# Patient Record
Sex: MALE | Race: WHITE | URBAN - METROPOLITAN AREA
[De-identification: names, ages, dates, MRNs, and addresses within clinical notes are randomized per-mention and may not be internally consistent; named-entity substitution may affect disease eponyms.]

---

## 2017-06-07 ENCOUNTER — HISTORICAL (OUTPATIENT)
Dept: ADMINISTRATIVE | Facility: HOSPITAL | Age: 82
End: 2017-06-07

## 2017-06-07 LAB
ERYTHROCYTE [DISTWIDTH] IN BLOOD BY AUTOMATED COUNT: 16.9 % (ref 11.5–17)
HCT VFR BLD AUTO: 39.5 % (ref 42–52)
HGB BLD-MCNC: 12.4 GM/DL (ref 14–18)
MCH RBC QN AUTO: 26.7 PG (ref 27–31)
MCHC RBC AUTO-ENTMCNC: 31.4 GM/DL (ref 33–36)
MCV RBC AUTO: 85.1 FL (ref 80–94)
PLATELET # BLD AUTO: 58 X10(3)/MCL (ref 130–400)
PMV BLD AUTO: ABNORMAL FL (ref 9.4–12.4)
RBC # BLD AUTO: 4.64 X10(6)/MCL (ref 4.7–6.1)
WBC # SPEC AUTO: 3.5 X10(3)/MCL (ref 4.5–11.5)

## 2017-08-31 ENCOUNTER — HISTORICAL (OUTPATIENT)
Dept: ADMINISTRATIVE | Facility: HOSPITAL | Age: 82
End: 2017-08-31

## 2017-08-31 LAB
ABS NEUT (OLG): 1 X10(3)/MCL (ref 2.1–9.2)
ALBUMIN SERPL-MCNC: 4.2 GM/DL (ref 3.4–5)
ALBUMIN/GLOB SERPL: 0.9 RATIO (ref 1.1–2)
ALP SERPL-CCNC: 63 UNIT/L (ref 50–136)
ALT SERPL-CCNC: 14 UNIT/L (ref 12–78)
ANISOCYTOSIS BLD QL SMEAR: ABNORMAL
AST SERPL-CCNC: 16 UNIT/L (ref 15–37)
BASOPHILS # BLD AUTO: 0.1 X10(3)/MCL (ref 0–0.2)
BASOPHILS NFR BLD AUTO: 2.5 %
BASOPHILS NFR BLD MANUAL: 1 % (ref 0–2)
BILIRUB SERPL-MCNC: 1 MG/DL (ref 0.2–1)
BILIRUBIN DIRECT+TOT PNL SERPL-MCNC: 0.3 MG/DL (ref 0–0.5)
BILIRUBIN DIRECT+TOT PNL SERPL-MCNC: 0.7 MG/DL (ref 0–0.8)
BUN SERPL-MCNC: 27 MG/DL (ref 7–18)
CALCIUM SERPL-MCNC: 9.5 MG/DL (ref 8.5–10.1)
CHLORIDE SERPL-SCNC: 107 MMOL/L (ref 98–107)
CO2 SERPL-SCNC: 27 MMOL/L (ref 21–32)
CREAT SERPL-MCNC: 1.15 MG/DL (ref 0.7–1.3)
EOSINOPHIL # BLD AUTO: 0 X10(3)/MCL (ref 0–0.9)
EOSINOPHIL NFR BLD AUTO: 0.5 %
ERYTHROCYTE [DISTWIDTH] IN BLOOD BY AUTOMATED COUNT: 17.6 % (ref 11.5–17)
GLOBULIN SER-MCNC: 4.5 GM/DL (ref 2.4–3.5)
GLUCOSE SERPL-MCNC: 98 MG/DL (ref 74–106)
HCT VFR BLD AUTO: 41.6 % (ref 42–52)
HGB BLD-MCNC: 12.9 GM/DL (ref 14–18)
LYMPHOCYTES # BLD AUTO: 1.4 X10(3)/MCL (ref 0.6–4.6)
LYMPHOCYTES NFR BLD AUTO: 33.7 %
LYMPHOCYTES NFR BLD MANUAL: 1 %
LYMPHOCYTES NFR BLD MANUAL: 42 % (ref 13–40)
MCH RBC QN AUTO: 26.1 PG (ref 27–31)
MCHC RBC AUTO-ENTMCNC: 31 GM/DL (ref 33–36)
MCV RBC AUTO: 84.2 FL (ref 80–94)
MONOCYTES # BLD AUTO: 1.6 X10(3)/MCL (ref 0.1–1.3)
MONOCYTES NFR BLD AUTO: 38.7 %
MONOCYTES NFR BLD MANUAL: 28 % (ref 2–11)
NEUTROPHILS # BLD AUTO: 1 X10(3)/MCL (ref 2.1–9.2)
NEUTROPHILS NFR BLD AUTO: 24.6 %
NEUTROPHILS NFR BLD MANUAL: 29 % (ref 47–80)
PLATELET # BLD AUTO: 48 X10(3)/MCL (ref 130–400)
PLATELET # BLD EST: ABNORMAL 10*3/UL
PMV BLD AUTO: ABNORMAL FL (ref 9.4–12.4)
POTASSIUM SERPL-SCNC: 4.1 MMOL/L (ref 3.5–5.1)
PROT SERPL-MCNC: 8.7 GM/DL (ref 6.4–8.2)
RBC # BLD AUTO: 4.94 X10(6)/MCL (ref 4.7–6.1)
RBC MORPH BLD: ABNORMAL
SODIUM SERPL-SCNC: 143 MMOL/L (ref 136–145)
WBC # SPEC AUTO: 4.1 X10(3)/MCL (ref 4.5–11.5)

## 2017-12-22 LAB
INFLUENZA A ANTIGEN, POC: POSITIVE
INFLUENZA B ANTIGEN, POC: NEGATIVE

## 2018-02-28 ENCOUNTER — HISTORICAL (OUTPATIENT)
Dept: ADMINISTRATIVE | Facility: HOSPITAL | Age: 83
End: 2018-02-28

## 2018-02-28 LAB
ABS NEUT (OLG): 0.77 X10(3)/MCL (ref 2.1–9.2)
ANISOCYTOSIS BLD QL SMEAR: 1
BASOPHILS # BLD AUTO: 0 X10(3)/MCL (ref 0–0.2)
BASOPHILS NFR BLD AUTO: 0.2 %
DACRYOCYTES BLD QL SMEAR: 1 % (ref 0–0)
EOSINOPHIL # BLD AUTO: 0 X10(3)/MCL (ref 0–0.9)
EOSINOPHIL NFR BLD AUTO: 0.2 %
ERYTHROCYTE [DISTWIDTH] IN BLOOD BY AUTOMATED COUNT: 18.1 % (ref 11.5–17)
HCT VFR BLD AUTO: 38.1 % (ref 42–52)
HGB BLD-MCNC: 12.3 GM/DL (ref 14–18)
HYPOCHROMIA BLD QL SMEAR: 0
LYMPHOCYTES # BLD AUTO: 1.4 X10(3)/MCL (ref 0.6–4.6)
LYMPHOCYTES NFR BLD AUTO: 35.1 %
LYMPHOCYTES NFR BLD MANUAL: 39 % (ref 13–40)
MACROCYTES BLD QL SMEAR: 0
MCH RBC QN AUTO: 27.6 PG (ref 27–31)
MCHC RBC AUTO-ENTMCNC: 32.3 GM/DL (ref 33–36)
MCV RBC AUTO: 85.4 FL (ref 80–94)
MICROCYTES BLD QL SMEAR: 1
MONOCYTES # BLD AUTO: 1.8 X10(3)/MCL (ref 0.1–1.3)
MONOCYTES NFR BLD AUTO: 45.4 %
MONOCYTES NFR BLD MANUAL: 33 % (ref 2–11)
NEUTROPHILS # BLD AUTO: 0.8 X10(3)/MCL (ref 2.1–9.2)
NEUTROPHILS NFR BLD AUTO: 19.1 %
NEUTROPHILS NFR BLD MANUAL: 29 % (ref 47–80)
PLATELET # BLD AUTO: 49 X10(3)/MCL (ref 130–400)
PLATELET # BLD EST: ABNORMAL 10*3/UL
PMV BLD AUTO: ABNORMAL FL (ref 9.4–12.4)
POIKILOCYTOSIS BLD QL SMEAR: 1
POLYCHROMASIA BLD QL SMEAR: 0
RBC # BLD AUTO: 4.46 X10(6)/MCL (ref 4.7–6.1)
WBC # SPEC AUTO: 4 X10(3)/MCL (ref 4.5–11.5)

## 2018-08-29 ENCOUNTER — HISTORICAL (OUTPATIENT)
Dept: ADMINISTRATIVE | Facility: HOSPITAL | Age: 83
End: 2018-08-29

## 2018-08-29 LAB
ABS NEUT (OLG): 0.93 X10(3)/MCL (ref 2.1–9.2)
ALBUMIN SERPL-MCNC: 3.7 GM/DL (ref 3.4–5)
ALBUMIN/GLOB SERPL: 0.8 RATIO (ref 1.1–2)
ALP SERPL-CCNC: 55 UNIT/L (ref 50–136)
ALT SERPL-CCNC: 13 UNIT/L (ref 12–78)
ANISOCYTOSIS BLD QL SMEAR: 1
AST SERPL-CCNC: 16 UNIT/L (ref 15–37)
BASOPHILS # BLD AUTO: 0 X10(3)/MCL (ref 0–0.2)
BASOPHILS NFR BLD AUTO: 0.3 %
BILIRUB SERPL-MCNC: 0.8 MG/DL (ref 0.2–1)
BILIRUBIN DIRECT+TOT PNL SERPL-MCNC: 0.3 MG/DL (ref 0–0.5)
BILIRUBIN DIRECT+TOT PNL SERPL-MCNC: 0.5 MG/DL (ref 0–0.8)
BUN SERPL-MCNC: 19 MG/DL (ref 7–18)
CALCIUM SERPL-MCNC: 9.2 MG/DL (ref 8.5–10.1)
CHLORIDE SERPL-SCNC: 109 MMOL/L (ref 98–107)
CO2 SERPL-SCNC: 27 MMOL/L (ref 21–32)
CREAT SERPL-MCNC: 1.15 MG/DL (ref 0.7–1.3)
EOSINOPHIL # BLD AUTO: 0 X10(3)/MCL (ref 0–0.9)
EOSINOPHIL NFR BLD AUTO: 0.3 %
EOSINOPHIL NFR BLD MANUAL: 1 % (ref 0–8)
ERYTHROCYTE [DISTWIDTH] IN BLOOD BY AUTOMATED COUNT: 17.6 % (ref 11.5–17)
GLOBULIN SER-MCNC: 4.6 GM/DL (ref 2.4–3.5)
GLUCOSE SERPL-MCNC: 89 MG/DL (ref 74–106)
HCT VFR BLD AUTO: 37.5 % (ref 42–52)
HGB BLD-MCNC: 11.8 GM/DL (ref 14–18)
LYMPHOCYTES # BLD AUTO: 1.2 X10(3)/MCL (ref 0.6–4.6)
LYMPHOCYTES NFR BLD AUTO: 29.9 %
LYMPHOCYTES NFR BLD MANUAL: 28 % (ref 13–40)
MCH RBC QN AUTO: 27.3 PG (ref 27–31)
MCHC RBC AUTO-ENTMCNC: 31.5 GM/DL (ref 33–36)
MCV RBC AUTO: 86.6 FL (ref 80–94)
MONOCYTES # BLD AUTO: 1.8 X10(3)/MCL (ref 0.1–1.3)
MONOCYTES NFR BLD AUTO: 45.8 %
MONOCYTES NFR BLD MANUAL: 39 % (ref 2–11)
NEUTROPHILS # BLD AUTO: 0.9 X10(3)/MCL (ref 2.1–9.2)
NEUTROPHILS NFR BLD AUTO: 23.7 %
NEUTROPHILS NFR BLD MANUAL: 32 % (ref 47–80)
PLATELET # BLD AUTO: 50 X10(3)/MCL (ref 130–400)
PLATELET # BLD EST: ABNORMAL 10*3/UL
PMV BLD AUTO: ABNORMAL FL (ref 9.4–12.4)
POTASSIUM SERPL-SCNC: 4.1 MMOL/L (ref 3.5–5.1)
PROT SERPL-MCNC: 8.3 GM/DL (ref 6.4–8.2)
RBC # BLD AUTO: 4.33 X10(6)/MCL (ref 4.7–6.1)
SODIUM SERPL-SCNC: 143 MMOL/L (ref 136–145)
WBC # SPEC AUTO: 3.9 X10(3)/MCL (ref 4.5–11.5)

## 2018-11-16 ENCOUNTER — HISTORICAL (OUTPATIENT)
Dept: ADMINISTRATIVE | Facility: HOSPITAL | Age: 83
End: 2018-11-16

## 2018-11-16 LAB
ABS NEUT (OLG): 2.43 X10(3)/MCL (ref 2.1–9.2)
ALBUMIN SERPL-MCNC: 2.5 GM/DL (ref 3.4–5)
ALBUMIN/GLOB SERPL: 1 RATIO (ref 1–2)
ALP SERPL-CCNC: 49 UNIT/L (ref 50–136)
ALT SERPL-CCNC: 8 UNIT/L (ref 12–78)
AST SERPL-CCNC: 12 UNIT/L (ref 15–37)
BASOPHILS # BLD AUTO: 0.02 X10(3)/MCL (ref 0–0.2)
BASOPHILS NFR BLD AUTO: 0.2 %
BILIRUB SERPL-MCNC: 0.7 MG/DL (ref 0.2–1)
BILIRUBIN DIRECT+TOT PNL SERPL-MCNC: 0.3 MG/DL (ref 0–0.8)
BILIRUBIN DIRECT+TOT PNL SERPL-MCNC: 0.4 MG/DL (ref 0–0.5)
BUN SERPL-MCNC: 30 MG/DL (ref 7–18)
CALCIUM SERPL-MCNC: 8 MG/DL (ref 8.5–10.1)
CHLORIDE SERPL-SCNC: 106 MMOL/L (ref 98–107)
CO2 SERPL-SCNC: 22 MMOL/L (ref 21–32)
CREAT SERPL-MCNC: 5.35 MG/DL (ref 0.7–1.3)
EOSINOPHIL # BLD AUTO: 0.16 X10(3)/MCL (ref 0–0.9)
EOSINOPHIL NFR BLD AUTO: 1.8 %
ERYTHROCYTE [DISTWIDTH] IN BLOOD BY AUTOMATED COUNT: 16.7 % (ref 11.5–17)
GLOBULIN SER-MCNC: 4 GM/DL (ref 2–4)
GLUCOSE SERPL-MCNC: 86 MG/DL (ref 74–106)
HCT VFR BLD AUTO: 35.4 % (ref 42–52)
HGB BLD-MCNC: 10.5 GM/DL (ref 14–18)
LYMPHOCYTES # BLD AUTO: 1.66 X10(3)/MCL (ref 0.6–4.6)
LYMPHOCYTES NFR BLD AUTO: 18.7 %
MCH RBC QN AUTO: 27.6 PG (ref 27–31)
MCHC RBC AUTO-ENTMCNC: 29.7 GM/DL (ref 33–36)
MCV RBC AUTO: 92.9 FL (ref 80–94)
MONOCYTES # BLD AUTO: 4.07 X10(3)/MCL (ref 0.1–1.3)
MONOCYTES NFR BLD AUTO: 45.8 %
NEUTROPHILS # BLD AUTO: 2.43 X10(3)/MCL (ref 2.1–9.2)
NEUTROPHILS NFR BLD AUTO: 27.3 %
PLATELET # BLD AUTO: 120 X10(3)/MCL (ref 130–400)
PMV BLD AUTO: ABNORMAL FL (ref 7.4–10.4)
POTASSIUM SERPL-SCNC: 3.4 MMOL/L (ref 3.5–5.1)
PREALB SERPL-MCNC: 11.2 MG/DL (ref 18–38)
PROT SERPL-MCNC: 7 GM/DL (ref 6.4–8.2)
RBC # BLD AUTO: 3.81 X10(6)/MCL (ref 4.7–6.1)
SODIUM SERPL-SCNC: 138 MMOL/L (ref 136–145)
WBC # SPEC AUTO: 8.9 X10(3)/MCL (ref 4.5–11.5)

## 2018-11-17 LAB
ABS NEUT (OLG): 1.7 X10(3)/MCL (ref 2.1–9.2)
ANISOCYTOSIS BLD QL SMEAR: ABNORMAL
BASOPHILS NFR BLD MANUAL: 0 %
BUN SERPL-MCNC: 24 MG/DL (ref 7–18)
CALCIUM SERPL-MCNC: 7.8 MG/DL (ref 8.5–10.1)
CHLORIDE SERPL-SCNC: 106 MMOL/L (ref 98–107)
CO2 SERPL-SCNC: 21 MMOL/L (ref 21–32)
CREAT SERPL-MCNC: 4.78 MG/DL (ref 0.7–1.3)
CREAT/UREA NIT SERPL: 5
DACRYOCYTES BLD QL SMEAR: ABNORMAL % (ref 0–0)
EOSINOPHIL NFR BLD MANUAL: 2 % (ref 0–5)
ERYTHROCYTE [DISTWIDTH] IN BLOOD BY AUTOMATED COUNT: 16.7 % (ref 11.5–17)
GLUCOSE SERPL-MCNC: 91 MG/DL (ref 74–106)
GRANULOCYTES NFR BLD MANUAL: 25 %
HCT VFR BLD AUTO: 28.2 % (ref 42–52)
HGB BLD-MCNC: 9.1 GM/DL (ref 14–18)
HYPOCHROMIA BLD QL SMEAR: ABNORMAL
LYMPHOCYTES NFR BLD MANUAL: 15 % (ref 24–44)
MCH RBC QN AUTO: 27.6 PG (ref 27–31)
MCHC RBC AUTO-ENTMCNC: 32.3 GM/DL (ref 33–36)
MCV RBC AUTO: 85.5 FL (ref 80–94)
MONOCYTES NFR BLD MANUAL: 58 % (ref 4–8)
OVALOCYTES BLD QL SMEAR: SLIGHT
PLATELET # BLD AUTO: 77 X10(3)/MCL (ref 130–400)
PLATELET # BLD EST: ADEQUATE 10*3/UL
PMV BLD AUTO: ABNORMAL FL (ref 7.4–10.4)
POIKILOCYTOSIS BLD QL SMEAR: ABNORMAL
POLYCHROMASIA BLD QL SMEAR: SLIGHT
POTASSIUM SERPL-SCNC: 3.6 MMOL/L (ref 3.5–5.1)
RBC # BLD AUTO: 3.3 X10(6)/MCL (ref 4.7–6.1)
RBC MORPH BLD: ABNORMAL
SODIUM SERPL-SCNC: 138 MMOL/L (ref 136–145)
WBC # SPEC AUTO: 8 X10(3)/MCL (ref 4.5–11.5)

## 2018-11-18 LAB
ALBUMIN SERPL-MCNC: 2.7 GM/DL (ref 3.4–5)
ALBUMIN/GLOB SERPL: 1 {RATIO}
ALP SERPL-CCNC: 51 UNIT/L (ref 45–117)
ALT SERPL-CCNC: 7 UNIT/L (ref 16–61)
AST SERPL-CCNC: 16 UNIT/L (ref 15–37)
BILIRUB SERPL-MCNC: 0.9 MG/DL (ref 0.2–1)
BILIRUBIN DIRECT+TOT PNL SERPL-MCNC: 0.4 MG/DL (ref 0–0.2)
BILIRUBIN DIRECT+TOT PNL SERPL-MCNC: 0.5 MG/DL (ref 0–1)
BUN SERPL-MCNC: 33 MG/DL (ref 7–18)
CALCIUM SERPL-MCNC: 8 MG/DL (ref 8.5–10.1)
CHLORIDE SERPL-SCNC: 106 MMOL/L (ref 98–107)
CO2 SERPL-SCNC: 22 MMOL/L (ref 21–32)
CREAT SERPL-MCNC: 5.85 MG/DL (ref 0.7–1.3)
GLOBULIN SER-MCNC: 5 GM/DL (ref 2–4)
GLUCOSE SERPL-MCNC: 96 MG/DL (ref 74–106)
POTASSIUM SERPL-SCNC: 3.7 MMOL/L (ref 3.5–5.1)
PROT SERPL-MCNC: 7.8 GM/DL (ref 6.4–8.2)
SODIUM SERPL-SCNC: 137 MMOL/L (ref 136–145)

## 2018-11-19 LAB
ABS NEUT (OLG): 1.08 X10(3)/MCL (ref 2.1–9.2)
ALBUMIN SERPL-MCNC: 2.6 GM/DL (ref 3.4–5)
ALBUMIN/GLOB SERPL: 1 {RATIO}
ALP SERPL-CCNC: 48 UNIT/L (ref 45–117)
ALT SERPL-CCNC: 7 UNIT/L (ref 16–61)
ANISOCYTOSIS BLD QL SMEAR: ABNORMAL
AST SERPL-CCNC: 15 UNIT/L (ref 15–37)
BASOPHILS NFR BLD MANUAL: 0 %
BILIRUB SERPL-MCNC: 0.7 MG/DL (ref 0.2–1)
BILIRUBIN DIRECT+TOT PNL SERPL-MCNC: 0.3 MG/DL (ref 0–0.2)
BILIRUBIN DIRECT+TOT PNL SERPL-MCNC: 0.4 MG/DL (ref 0–1)
BUN SERPL-MCNC: 39 MG/DL (ref 7–18)
CALCIUM SERPL-MCNC: 7.9 MG/DL (ref 8.5–10.1)
CHLORIDE SERPL-SCNC: 105 MMOL/L (ref 98–107)
CO2 SERPL-SCNC: 20 MMOL/L (ref 21–32)
CREAT SERPL-MCNC: 6.82 MG/DL (ref 0.7–1.3)
EOSINOPHIL NFR BLD MANUAL: 0 % (ref 0–5)
ERYTHROCYTE [DISTWIDTH] IN BLOOD BY AUTOMATED COUNT: 17.1 % (ref 11.5–17)
GLOBULIN SER-MCNC: 5 GM/DL (ref 2–4)
GLUCOSE SERPL-MCNC: 89 MG/DL (ref 74–106)
GRANULOCYTES NFR BLD MANUAL: 16 %
HCT VFR BLD AUTO: 28.6 % (ref 42–52)
HGB BLD-MCNC: 9.1 GM/DL (ref 14–18)
HYPOCHROMIA BLD QL SMEAR: ABNORMAL
LYMPHOCYTES NFR BLD MANUAL: 38 % (ref 24–44)
MACROCYTES BLD QL SMEAR: ABNORMAL
MCH RBC QN AUTO: 27 PG (ref 27–31)
MCHC RBC AUTO-ENTMCNC: 31.8 GM/DL (ref 33–36)
MCV RBC AUTO: 84.9 FL (ref 80–94)
MONOCYTES NFR BLD MANUAL: 46 % (ref 4–8)
OVALOCYTES BLD QL SMEAR: ABNORMAL
PHOSPHATE SERPL-MCNC: 4.3 MG/DL (ref 2.5–4.9)
PLATELET # BLD AUTO: 77 X10(3)/MCL (ref 130–400)
PLATELET # BLD EST: ABNORMAL 10*3/UL
PMV BLD AUTO: ABNORMAL FL (ref 7.4–10.4)
POIKILOCYTOSIS BLD QL SMEAR: ABNORMAL
POTASSIUM SERPL-SCNC: 3.8 MMOL/L (ref 3.5–5.1)
PREALB SERPL-MCNC: 10.4 MG/DL (ref 20–40)
PROT SERPL-MCNC: 7.6 GM/DL (ref 6.4–8.2)
RBC # BLD AUTO: 3.37 X10(6)/MCL (ref 4.7–6.1)
RBC MORPH BLD: ABNORMAL
SODIUM SERPL-SCNC: 137 MMOL/L (ref 136–145)
WBC # SPEC AUTO: 5.8 X10(3)/MCL (ref 4.5–11.5)

## 2018-11-21 LAB
ABS NEUT (OLG): 0.74 X10(3)/MCL (ref 2.1–9.2)
ANISOCYTOSIS BLD QL SMEAR: ABNORMAL
BASOPHILS NFR BLD MANUAL: 0 %
BUN SERPL-MCNC: 38 MG/DL (ref 7–18)
CALCIUM SERPL-MCNC: 7.9 MG/DL (ref 8.5–10.1)
CHLORIDE SERPL-SCNC: 103 MMOL/L (ref 98–107)
CO2 SERPL-SCNC: 20 MMOL/L (ref 21–32)
CREAT SERPL-MCNC: 7.03 MG/DL (ref 0.7–1.3)
EOSINOPHIL NFR BLD MANUAL: 0 % (ref 0–5)
ERYTHROCYTE [DISTWIDTH] IN BLOOD BY AUTOMATED COUNT: 16.8 % (ref 11.5–17)
GLUCOSE SERPL-MCNC: 85 MG/DL (ref 74–106)
GRANULOCYTES NFR BLD MANUAL: 17 %
HCT VFR BLD AUTO: 29.2 % (ref 42–52)
HGB BLD-MCNC: 9.4 GM/DL (ref 14–18)
HYPOCHROMIA BLD QL SMEAR: SLIGHT
LYMPHOCYTES NFR BLD MANUAL: 30 % (ref 24–44)
MCH RBC QN AUTO: 27.3 PG (ref 27–31)
MCHC RBC AUTO-ENTMCNC: 32.2 GM/DL (ref 33–36)
MCV RBC AUTO: 84.9 FL (ref 80–94)
MONOCYTES NFR BLD MANUAL: 53 % (ref 4–8)
PHOSPHATE SERPL-MCNC: 4.7 MG/DL (ref 2.5–4.9)
PLATELET # BLD AUTO: 80 X10(3)/MCL (ref 130–400)
PLATELET # BLD EST: ABNORMAL 10*3/UL
PMV BLD AUTO: ABNORMAL FL (ref 7.4–10.4)
POIKILOCYTOSIS BLD QL SMEAR: ABNORMAL
POTASSIUM SERPL-SCNC: 4.1 MMOL/L (ref 3.5–5.1)
RBC # BLD AUTO: 3.44 X10(6)/MCL (ref 4.7–6.1)
RBC MORPH BLD: ABNORMAL
SODIUM SERPL-SCNC: 136 MMOL/L (ref 136–145)
WBC # SPEC AUTO: 4.8 X10(3)/MCL (ref 4.5–11.5)

## 2018-11-23 LAB
ANISOCYTOSIS BLD QL SMEAR: ABNORMAL
BUN SERPL-MCNC: 45 MG/DL (ref 7–18)
CALCIUM SERPL-MCNC: 8.4 MG/DL (ref 8.5–10.1)
CHLORIDE SERPL-SCNC: 100 MMOL/L (ref 98–107)
CO2 SERPL-SCNC: 20 MMOL/L (ref 21–32)
CREAT SERPL-MCNC: 6.93 MG/DL (ref 0.7–1.3)
CREAT/UREA NIT SERPL: 6
EOSINOPHIL NFR BLD MANUAL: 1 % (ref 0–5)
ERYTHROCYTE [DISTWIDTH] IN BLOOD BY AUTOMATED COUNT: 16.9 % (ref 11.5–17)
GLUCOSE SERPL-MCNC: 96 MG/DL (ref 74–106)
HCT VFR BLD AUTO: 31.5 % (ref 42–52)
HGB BLD-MCNC: 10.1 GM/DL (ref 14–18)
HYPOCHROMIA BLD QL SMEAR: ABNORMAL
LYMPHOCYTES NFR BLD MANUAL: 23 % (ref 24–44)
MCH RBC QN AUTO: 26.7 PG (ref 27–31)
MCHC RBC AUTO-ENTMCNC: 32.1 GM/DL (ref 33–36)
MCV RBC AUTO: 83.3 FL (ref 80–94)
METAMYELOCYTES NFR BLD MANUAL: 1 %
MONOCYTES NFR BLD MANUAL: 62 % (ref 4–8)
NEUTROPHILS NFR BLD MANUAL: 13 % (ref 47–80)
PLATELET # BLD AUTO: 89 X10(3)/MCL (ref 130–400)
PLATELET # BLD EST: ABNORMAL 10*3/UL
PMV BLD AUTO: ABNORMAL FL (ref 7.4–10.4)
POIKILOCYTOSIS BLD QL SMEAR: ABNORMAL
POTASSIUM SERPL-SCNC: 3.8 MMOL/L (ref 3.5–5.1)
RBC # BLD AUTO: 3.78 X10(6)/MCL (ref 4.7–6.1)
RBC MORPH BLD: ABNORMAL
SODIUM SERPL-SCNC: 132 MMOL/L (ref 136–145)
WBC # SPEC AUTO: 5.7 X10(3)/MCL (ref 4.5–11.5)

## 2018-11-26 LAB
ABS NEUT (OLG): 2.78 X10(3)/MCL (ref 2.1–9.2)
ALBUMIN SERPL-MCNC: 2.9 GM/DL (ref 3.4–5)
ALBUMIN/GLOB SERPL: 1 {RATIO}
ALP SERPL-CCNC: 49 UNIT/L (ref 45–117)
ALT SERPL-CCNC: 9 UNIT/L (ref 16–61)
ANISOCYTOSIS BLD QL SMEAR: ABNORMAL
AST SERPL-CCNC: 15 UNIT/L (ref 15–37)
BASOPHILS NFR BLD MANUAL: 0 %
BILIRUB SERPL-MCNC: 1 MG/DL (ref 0.2–1)
BILIRUBIN DIRECT+TOT PNL SERPL-MCNC: 0.4 MG/DL (ref 0–1)
BILIRUBIN DIRECT+TOT PNL SERPL-MCNC: 0.6 MG/DL (ref 0–0.2)
BUN SERPL-MCNC: 72 MG/DL (ref 7–18)
CALCIUM SERPL-MCNC: 8 MG/DL (ref 8.5–10.1)
CHLORIDE SERPL-SCNC: 97 MMOL/L (ref 98–107)
CO2 SERPL-SCNC: 20 MMOL/L (ref 21–32)
CREAT SERPL-MCNC: 7.9 MG/DL (ref 0.7–1.3)
EOSINOPHIL NFR BLD MANUAL: 0 % (ref 0–5)
ERYTHROCYTE [DISTWIDTH] IN BLOOD BY AUTOMATED COUNT: 17.1 % (ref 11.5–17)
GLOBULIN SER-MCNC: 5 GM/DL (ref 2–4)
GLUCOSE SERPL-MCNC: 122 MG/DL (ref 74–106)
GRANULOCYTES NFR BLD MANUAL: 47 %
HCT VFR BLD AUTO: 32.5 % (ref 42–52)
HGB BLD-MCNC: 10.4 GM/DL (ref 14–18)
LYMPHOCYTES NFR BLD MANUAL: 6 % (ref 24–44)
MCH RBC QN AUTO: 26.7 PG (ref 27–31)
MCHC RBC AUTO-ENTMCNC: 32 GM/DL (ref 33–36)
MCV RBC AUTO: 83.5 FL (ref 80–94)
MONOCYTES NFR BLD MANUAL: 45 % (ref 4–8)
MYELOCYTES NFR BLD MANUAL: 1 %
NEUTS BAND NFR BLD MANUAL: 1 % (ref 6–10)
PLATELET # BLD AUTO: 81 X10(3)/MCL (ref 130–400)
PLATELET # BLD EST: ABNORMAL 10*3/UL
PMV BLD AUTO: ABNORMAL FL (ref 7.4–10.4)
POTASSIUM SERPL-SCNC: 4.8 MMOL/L (ref 3.5–5.1)
PREALB SERPL-MCNC: 14.6 MG/DL (ref 20–40)
PROT SERPL-MCNC: 8 GM/DL (ref 6.4–8.2)
RBC # BLD AUTO: 3.89 X10(6)/MCL (ref 4.7–6.1)
RBC MORPH BLD: ABNORMAL
SODIUM SERPL-SCNC: 132 MMOL/L (ref 136–145)
WBC # SPEC AUTO: 7.2 X10(3)/MCL (ref 4.5–11.5)

## 2018-11-28 LAB
BUN SERPL-MCNC: 68 MG/DL (ref 7–18)
CALCIUM SERPL-MCNC: 7.3 MG/DL (ref 8.5–10.1)
CHLORIDE SERPL-SCNC: 98 MMOL/L (ref 98–107)
CO2 SERPL-SCNC: 22 MMOL/L (ref 21–32)
CREAT SERPL-MCNC: 6.64 MG/DL (ref 0.7–1.3)
CREAT/UREA NIT SERPL: 10
GLUCOSE SERPL-MCNC: 103 MG/DL (ref 74–106)
MAGNESIUM SERPL-MCNC: 2.2 MG/DL (ref 1.8–2.4)
PHOSPHATE SERPL-MCNC: 5.5 MG/DL (ref 2.5–4.9)
POTASSIUM SERPL-SCNC: 4.7 MMOL/L (ref 3.5–5.1)
SODIUM SERPL-SCNC: 133 MMOL/L (ref 136–145)

## 2022-04-11 ENCOUNTER — HISTORICAL (OUTPATIENT)
Dept: ADMINISTRATIVE | Facility: HOSPITAL | Age: 87
End: 2022-04-11

## 2022-04-28 VITALS
OXYGEN SATURATION: 99 % | HEIGHT: 73 IN | SYSTOLIC BLOOD PRESSURE: 163 MMHG | WEIGHT: 196 LBS | DIASTOLIC BLOOD PRESSURE: 83 MMHG | BODY MASS INDEX: 25.98 KG/M2

## 2022-04-30 NOTE — OP NOTE
Patient:   Rafael Cabrera            MRN: [id number]            FIN: [id number]               Age:   83 years     Sex:  Male     :  10/12/1933   Associated Diagnoses:   None   Author:   Drew Arnett MD      Preoperative diagnosis: Cataract of the  left eye     Postoperative diagnosis: Same     Operative procedure: Cataract extraction with intraocular lens implant    Lens Style: [id number]    The patient was brought to the operating theater by wheelchair and under light sedation given topical anesthesia using 0.75% Marcaine.  After adequate anesthesia the patient was then prepped and draped in usual ophthalmological manner.   David lid speculums were applied and under the surgical microscope a keratome incision was made temporally using a deborah keratome blade and a 15° deborah keratome stab incision was made in the superior nasal quadrant.  Viscoat was instilled into the anterior chamber and an anterior capsulorrhexis was performed using a bent 25-gauge needle and the anterior capsule flap withdrawn with Kelman forceps. Using an irrigating Kelman cystotome the nucleus was irrigated and rocked free from the cortical bed and the handpiece was withdrawn. The phacoemulsification handpiece was introduced into the eye and phacoemulsification carried out the posterior chamber and the iris plane while a nucleus manipulator was used to direct the nucleus fragments  towards the phacoemulsification tip and prevent corneal contact. After phacoemulsification of the nucleus was completed the handpiece was withdrawn and an irrigation-aspiration handpiece was introduced into the eye and all visible cortical fragments were aspirated from the eye without difficulty. The handpiece was withdrawn and Healon was introduced into the eye to inflate the anterior chamber and the capsular bag.  An intraocular lens implant was selected, inspected, folded and placed into the lens injector and then the lens carefully injected into  the capsular bag while the haptics were positioned using the nucleus manipulator. The Healon was then aspirated from the eye using an irrigation-aspiration handpiece and the handpiece withdrawn from the eye. The anterior chamber was inflated with balanced salt solution and the wound checked for water tightness and found to be intact.  The antibiotic drops used preoperatively were instilled into the eye and the patient sent to the outpatient recovery in good condition.

## 2022-09-21 ENCOUNTER — HISTORICAL (OUTPATIENT)
Dept: ADMINISTRATIVE | Facility: HOSPITAL | Age: 87
End: 2022-09-21